# Patient Record
Sex: FEMALE | Race: WHITE | NOT HISPANIC OR LATINO | Employment: OTHER | URBAN - METROPOLITAN AREA
[De-identification: names, ages, dates, MRNs, and addresses within clinical notes are randomized per-mention and may not be internally consistent; named-entity substitution may affect disease eponyms.]

---

## 2023-10-17 ENCOUNTER — ANCILLARY PROCEDURE (OUTPATIENT)
Dept: RADIOLOGY | Facility: CLINIC | Age: 68
End: 2023-10-17
Payer: MEDICARE

## 2023-10-17 DIAGNOSIS — Z12.31 ENCOUNTER FOR SCREENING MAMMOGRAM FOR MALIGNANT NEOPLASM OF BREAST: ICD-10-CM

## 2023-10-17 PROCEDURE — 77063 BREAST TOMOSYNTHESIS BI: CPT | Mod: 50

## 2023-10-17 PROCEDURE — 77063 BREAST TOMOSYNTHESIS BI: CPT | Mod: BILATERAL PROCEDURE | Performed by: RADIOLOGY

## 2023-10-17 PROCEDURE — 77067 SCR MAMMO BI INCL CAD: CPT | Mod: BILATERAL PROCEDURE | Performed by: RADIOLOGY

## 2023-11-15 ENCOUNTER — OFFICE VISIT (OUTPATIENT)
Dept: PODIATRY | Facility: CLINIC | Age: 68
End: 2023-11-15
Payer: MEDICARE

## 2023-11-15 DIAGNOSIS — M21.622 TAILOR'S BUNION OF BOTH FEET: ICD-10-CM

## 2023-11-15 DIAGNOSIS — M79.671 PAIN IN BOTH FEET: ICD-10-CM

## 2023-11-15 DIAGNOSIS — M21.621 TAILOR'S BUNION OF BOTH FEET: ICD-10-CM

## 2023-11-15 DIAGNOSIS — L84 CORNS AND CALLOSITIES: Primary | ICD-10-CM

## 2023-11-15 DIAGNOSIS — M79.672 PAIN IN BOTH FEET: ICD-10-CM

## 2023-11-15 PROCEDURE — 99213 OFFICE O/P EST LOW 20 MIN: CPT | Performed by: PODIATRIST

## 2023-11-15 PROCEDURE — 1159F MED LIST DOCD IN RCRD: CPT | Performed by: PODIATRIST

## 2023-11-15 RX ORDER — BUTYROSPERMUM PARKII(SHEA BUTTER), SIMMONDSIA CHINENSIS (JOJOBA) SEED OIL, ALOE BARBADENSIS LEAF EXTRACT .01; 1; 3.5 G/100G; G/100G; G/100G
250 LIQUID TOPICAL 2 TIMES DAILY
COMMUNITY

## 2023-11-15 RX ORDER — CALCIUM/D3/MAG/K2/MIN/HERB 326 333-32 MG
TABLET ORAL
COMMUNITY

## 2023-11-15 NOTE — PROGRESS NOTES
Chief Complaint   Patient presents with    Foot Callouses     Left foot      Complains of painful calluses of the left foot.  No changes in PMH medications allergies or past surgical history since last visit.    Physical Exam  Patient alert, oriented, no acute distress.     (+)Pre-ulcerative hypertrophic painful lesions located: sub 5th metatarsal head L 1.5cm in size, lesion has a deep core, medial PIPJ L hallux and Linear hyperkeratotic tissue located plantar  R hallux.  No erythema, no calor noted.  No drainage, no ulcer noted.  Tailor's bunion remains, no pain on the lateral eminence B/L foot.  Palpable pedal pulses B/L, +2/4.  Light touch is intact B/L.  Muscle function is intact B/L.   No lower extremity edema noted B/L.    Assessment and plan    #1 calluses B/L foot  Paring of all hyperkeratotic tissue with #86 blade and tissue nippers without complications.   Continue with daily foot lotion  Follow-up as needed    #2  Tailor's bunions B/L foot  No pain other than the painful callus.  Continue in wide supportive shoes  Follow-up as needed

## 2024-02-07 ENCOUNTER — OFFICE VISIT (OUTPATIENT)
Dept: PODIATRY | Facility: CLINIC | Age: 69
End: 2024-02-07
Payer: MEDICARE

## 2024-02-07 DIAGNOSIS — M79.671 PAIN IN BOTH FEET: ICD-10-CM

## 2024-02-07 DIAGNOSIS — M21.621 TAILOR'S BUNION OF BOTH FEET: ICD-10-CM

## 2024-02-07 DIAGNOSIS — M79.672 PAIN IN BOTH FEET: ICD-10-CM

## 2024-02-07 DIAGNOSIS — M21.622 TAILOR'S BUNION OF BOTH FEET: ICD-10-CM

## 2024-02-07 DIAGNOSIS — L84 CORNS AND CALLOSITIES: Primary | ICD-10-CM

## 2024-02-07 PROCEDURE — 1159F MED LIST DOCD IN RCRD: CPT | Performed by: PODIATRIST

## 2024-02-07 PROCEDURE — 99213 OFFICE O/P EST LOW 20 MIN: CPT | Performed by: PODIATRIST

## 2024-02-07 NOTE — PROGRESS NOTES
Chief Complaint   Patient presents with    Foot Callouses     Left  foot 5th met area      Complains of painful calluses of the left foot.  No other pedal complaints.  No changes in PMH medications allergies or past surgical history since last visit.    Physical Exam  Patient alert, oriented, no acute distress.     Pre-ulcerative hypertrophic painful lesions located: sub 5th metatarsal head L foot 1.5cm in size, lesion has a deep core, medial PIPJ L hallux 0.5 cm in size.  No erythema, no calor noted.  No drainage, no ulcer noted.  Webspaces are dry, clean, and intact B/L.  No skin xerosis noted B/L  Tailor's bunion B/L noted.  No pain on the lateral eminence 5th met head B/L foot.  Muscle function is intact B/L.   Palpable pedal pulses B/L, +2/4.  No lower extremity edema noted B/L.  CFT brisk all digits.  Light touch is intact B/L.    Assessment and plan    #1 calluses B/L foot  Paring of all hyperkeratotic tissue with #86 blade without complications.   Cryotherapy x 3 to the lesion underlying the fifth metatarsal head.  Continue with daily foot lotion  Follow-up as needed    #2  Tailor's bunions B/L foot  No pain other than the painful callus.  Continue in wide supportive shoes  Follow-up as needed

## 2024-05-10 ENCOUNTER — OFFICE VISIT (OUTPATIENT)
Dept: PODIATRY | Facility: CLINIC | Age: 69
End: 2024-05-10
Payer: MEDICARE

## 2024-05-10 DIAGNOSIS — M79.672 PAIN IN BOTH FEET: ICD-10-CM

## 2024-05-10 DIAGNOSIS — L84 CORNS AND CALLOSITIES: Primary | ICD-10-CM

## 2024-05-10 DIAGNOSIS — M21.622 TAILOR'S BUNION OF BOTH FEET: ICD-10-CM

## 2024-05-10 DIAGNOSIS — M21.621 TAILOR'S BUNION OF BOTH FEET: ICD-10-CM

## 2024-05-10 DIAGNOSIS — M79.671 PAIN IN BOTH FEET: ICD-10-CM

## 2024-05-10 PROBLEM — M19.90 OSTEOARTHRITIS: Status: ACTIVE | Noted: 2023-09-04

## 2024-05-10 PROBLEM — L85.9 HYPERKERATOSIS: Status: ACTIVE | Noted: 2024-05-10

## 2024-05-10 PROBLEM — M79.673 ACUTE FOOT PAIN: Status: ACTIVE | Noted: 2024-05-10

## 2024-05-10 PROBLEM — R26.89 GAIT, ANTALGIC: Status: ACTIVE | Noted: 2024-05-10

## 2024-05-10 PROBLEM — R06.02 SHORTNESS OF BREATH: Status: ACTIVE | Noted: 2024-05-10

## 2024-05-10 PROBLEM — M85.80 OSTEOPENIA: Status: ACTIVE | Noted: 2023-09-04

## 2024-05-10 PROBLEM — M72.2 PLANTAR FASCIITIS, RIGHT: Status: ACTIVE | Noted: 2024-05-10

## 2024-05-10 PROBLEM — R31.1 BENIGN MICROSCOPIC HEMATURIA: Status: ACTIVE | Noted: 2023-09-04

## 2024-05-10 PROBLEM — F17.210 SMOKING 1/2 PACK A DAY OR LESS: Status: ACTIVE | Noted: 2023-08-14

## 2024-05-10 PROBLEM — K63.5 POLYP OF COLON: Status: ACTIVE | Noted: 2022-09-12

## 2024-05-10 PROBLEM — E78.5 DYSLIPIDEMIA: Status: ACTIVE | Noted: 2023-08-14

## 2024-05-10 PROBLEM — K64.8 HEMORRHOIDS, INTERNAL: Status: ACTIVE | Noted: 2022-09-12

## 2024-05-10 PROBLEM — M51.9 DISORDER OF INTERVERTEBRAL DISC OF LUMBAR SPINE: Status: ACTIVE | Noted: 2023-08-14

## 2024-05-10 PROBLEM — D70.8 OTHER NEUTROPENIA (CMS-HCC): Status: ACTIVE | Noted: 2023-08-14

## 2024-05-10 PROBLEM — F17.200 SMOKING: Status: ACTIVE | Noted: 2023-08-14

## 2024-05-10 PROBLEM — R94.31 ABNORMAL EKG: Status: ACTIVE | Noted: 2024-05-10

## 2024-05-10 PROCEDURE — 99213 OFFICE O/P EST LOW 20 MIN: CPT | Performed by: PODIATRIST

## 2024-05-10 PROCEDURE — 1160F RVW MEDS BY RX/DR IN RCRD: CPT | Performed by: PODIATRIST

## 2024-05-10 PROCEDURE — 1159F MED LIST DOCD IN RCRD: CPT | Performed by: PODIATRIST

## 2024-05-10 NOTE — PROGRESS NOTES
Chief Complaint   Patient presents with    Foot Callouses     Patient is here today for calluses 5th met area DORINA      Complains of painful calluses bilateral foot.  Most pain is on the left foot underlying the fifth metatarsal head.  Patient tolerated cryotherapy well.    Physical Exam  Patient alert, oriented, no acute distress.     Pre-ulcerative hypertrophic painful lesions located: sub 5th metatarsal head L foot 1.5cm in size, lesion has a deep core, medial PIPJ L hallux 0.5 cm in size, linear lesion right plantar hallux and right plantar third digit.    No cellulitis noted B/L   Webspaces are dry, clean, and intact B/L.  No skin xerosis noted B/L  Tailor's bunion B/L noted.  No pain on the lateral eminence 5th met head B/L foot.  Muscle function is intact B/L.   Palpable pedal pulses B/L, +2/4.  No lower extremity edema noted B/L.  CFT brisk all digits.  Light touch is intact B/L.    Assessment and plan    #1 calluses B/L foot  Paring of all hyperkeratotic tissue with #15 blade without complications.   Cryotherapy x 3 to the lesion underlying the fifth metatarsal head left.  Continue with daily foot lotion  Follow-up as needed    #2  Tailor's bunions B/L foot  No pain other than the painful callus.  Continue in wide supportive shoes  Follow-up as needed

## 2024-07-17 ENCOUNTER — APPOINTMENT (OUTPATIENT)
Dept: PODIATRY | Facility: CLINIC | Age: 69
End: 2024-07-17
Payer: MEDICARE

## 2024-07-17 DIAGNOSIS — M79.671 PAIN IN BOTH FEET: ICD-10-CM

## 2024-07-17 DIAGNOSIS — M79.672 PAIN IN BOTH FEET: ICD-10-CM

## 2024-07-17 DIAGNOSIS — L84 CORNS AND CALLOSITIES: Primary | ICD-10-CM

## 2024-07-17 DIAGNOSIS — M21.621 TAILOR'S BUNION OF BOTH FEET: ICD-10-CM

## 2024-07-17 DIAGNOSIS — M21.622 TAILOR'S BUNION OF BOTH FEET: ICD-10-CM

## 2024-07-17 PROCEDURE — 99213 OFFICE O/P EST LOW 20 MIN: CPT | Performed by: PODIATRIST

## 2024-07-17 PROCEDURE — 1160F RVW MEDS BY RX/DR IN RCRD: CPT | Performed by: PODIATRIST

## 2024-07-17 PROCEDURE — 1159F MED LIST DOCD IN RCRD: CPT | Performed by: PODIATRIST

## 2024-07-17 NOTE — PROGRESS NOTES
Chief Complaint   Patient presents with    Foot Callouses     Patient is here today for calluses 5th met area DORINA      HPI: Complains of painful calluses bilateral foot.  Most pain is on the left foot underlying the fifth metatarsal head.  Patient tolerated cryotherapy well.  Patient requests paring of calluses .  Patient has been doing a lot of yard work in flip flops.  Patient is been using padding on the left     Physical Exam  Patient alert, oriented, no acute distress.     Pre-ulcerative hypertrophic painful lesions located: sub 5th metatarsal head L foot 2cm in size, lesion has a deep core, medial PIPJ L hallux 1 cm in size, linear lesion right plantar hallux and right plantar third digit.    No cellulitis noted B/L   Webspaces are dry, clean, and intact B/L.  No skin xerosis noted B/L  Tailor's bunion B/L noted.  No pain on the lateral eminence 5th met head B/L foot.  Muscle function is intact B/L.   Palpable pedal pulses B/L, +2/4.  No lower extremity edema noted B/L.  CFT brisk all digits.  Light touch is intact B/L.    Assessment and plan    #1 calluses B/L foot  Paring of all hyperkeratotic tissue with #86 blade without complications.   Cryotherapy x 3 to the lesion underlying the fifth metatarsal head left.  Continue with daily foot lotion  Patient instructed on wearing more supportive shoes with more padding while doing yard work.  Follow-up as needed    #2  Tailor's bunions B/L foot  No pain other than the painful callus.  Continue in wide supportive shoes  Follow-up as needed

## 2024-09-18 ENCOUNTER — APPOINTMENT (OUTPATIENT)
Dept: PODIATRY | Facility: CLINIC | Age: 69
End: 2024-09-18
Payer: MEDICARE

## 2024-09-18 DIAGNOSIS — L84 CORNS AND CALLOSITIES: Primary | ICD-10-CM

## 2024-09-18 DIAGNOSIS — M21.621 TAILOR'S BUNION OF BOTH FEET: ICD-10-CM

## 2024-09-18 DIAGNOSIS — M79.671 PAIN IN BOTH FEET: ICD-10-CM

## 2024-09-18 DIAGNOSIS — M79.672 PAIN IN BOTH FEET: ICD-10-CM

## 2024-09-18 DIAGNOSIS — M21.622 TAILOR'S BUNION OF BOTH FEET: ICD-10-CM

## 2024-09-18 PROCEDURE — 99213 OFFICE O/P EST LOW 20 MIN: CPT | Performed by: PODIATRIST

## 2024-09-18 PROCEDURE — 1159F MED LIST DOCD IN RCRD: CPT | Performed by: PODIATRIST

## 2024-09-18 PROCEDURE — 1160F RVW MEDS BY RX/DR IN RCRD: CPT | Performed by: PODIATRIST

## 2024-09-18 NOTE — PROGRESS NOTES
Chief Complaint   Patient presents with    Foot Callouses     PT here for foot callus care LT Foot       HPI: Complains of painful calluses.  Most pain is on the left foot underlying the fifth metatarsal head.  Patient tolerated cryotherapy well.      Physical Exam  Patient alert, oriented, no acute distress.     DERM: Pre-ulcerative hypertrophic painful lesions located: sub 5th metatarsal head L foot 2cm in size, lesion has a deep core, medial PIPJ L hallux 1 cm in size, linear lesion right plantar hallux and right plantar third digit.    No cellulitis noted B/L   Webspaces are dry, clean, and intact B/L.  No skin xerosis noted B/L    MUSCULOSKEL: Tailor's bunion B/L noted.  No pain on the lateral eminence 5th met head B/L foot.  Muscle function is intact B/L.     VASC: Palpable pedal pulses B/L, +2/4.  No lower extremity edema noted B/L.  CFT brisk all digits.    NEURO: Light touch is intact B/L.    Assessment and plan    #1 calluses B/L foot  Paring of all hyperkeratotic tissue with #86 blade without complications.   Cryotherapy x 3 to the lesion underlying the fifth metatarsal head left.  Continue with daily foot lotion  Follow-up as needed    #2  Tailor's bunions B/L foot  No pain other than the painful callus.  Continue in wide supportive shoes  Follow-up as needed

## 2024-09-19 ENCOUNTER — APPOINTMENT (OUTPATIENT)
Dept: PODIATRY | Facility: CLINIC | Age: 69
End: 2024-09-19
Payer: MEDICARE

## 2024-09-20 ENCOUNTER — APPOINTMENT (OUTPATIENT)
Dept: PODIATRY | Facility: CLINIC | Age: 69
End: 2024-09-20
Payer: MEDICARE

## 2024-10-21 ENCOUNTER — HOSPITAL ENCOUNTER (OUTPATIENT)
Dept: RADIOLOGY | Facility: CLINIC | Age: 69
Discharge: HOME | End: 2024-10-21
Payer: MEDICARE

## 2024-10-21 VITALS — BODY MASS INDEX: 25.52 KG/M2 | HEIGHT: 60 IN | WEIGHT: 130 LBS

## 2024-10-21 DIAGNOSIS — Z12.31 ENCOUNTER FOR SCREENING MAMMOGRAM FOR MALIGNANT NEOPLASM OF BREAST: ICD-10-CM

## 2024-10-21 PROCEDURE — 77067 SCR MAMMO BI INCL CAD: CPT

## 2024-10-21 PROCEDURE — 77067 SCR MAMMO BI INCL CAD: CPT | Performed by: RADIOLOGY

## 2024-10-21 PROCEDURE — 77063 BREAST TOMOSYNTHESIS BI: CPT | Performed by: RADIOLOGY

## 2024-11-20 ENCOUNTER — OFFICE VISIT (OUTPATIENT)
Dept: PODIATRY | Facility: CLINIC | Age: 69
End: 2024-11-20
Payer: MEDICARE

## 2024-11-20 DIAGNOSIS — M79.672 PAIN IN BOTH FEET: ICD-10-CM

## 2024-11-20 DIAGNOSIS — M21.621 TAILOR'S BUNION OF BOTH FEET: ICD-10-CM

## 2024-11-20 DIAGNOSIS — L84 CORNS AND CALLOSITIES: Primary | ICD-10-CM

## 2024-11-20 DIAGNOSIS — M79.671 PAIN IN BOTH FEET: ICD-10-CM

## 2024-11-20 DIAGNOSIS — M21.622 TAILOR'S BUNION OF BOTH FEET: ICD-10-CM

## 2024-11-20 PROCEDURE — 1159F MED LIST DOCD IN RCRD: CPT | Performed by: PODIATRIST

## 2024-11-20 PROCEDURE — 99213 OFFICE O/P EST LOW 20 MIN: CPT | Performed by: PODIATRIST

## 2024-11-20 PROCEDURE — 1160F RVW MEDS BY RX/DR IN RCRD: CPT | Performed by: PODIATRIST

## 2024-11-20 NOTE — PROGRESS NOTES
Chief Complaint   Patient presents with    Foot Callouses     PT here for callous care LT foot        HPI: Complains of painful calluses that limit ambulation.  Patient tolerated cryotherapy well.  No other pedal complaints.    Physical Exam  Patient alert, oriented, no acute distress.     DERM: Pre-ulcerative hypertrophic painful lesions located: sub 5th metatarsal head L foot 2cm in size, lesion has a deep core, medial PIPJ L hallux 1 cm in size, linear lesion right plantar hallux and right plantar third digit.    No ulcers noted B/L  No cellulitis noted B/L   Webspaces are dry, clean, and intact B/L.  Skin appears to be well-hydrated.    MUSCULOSKEL: Tailor's bunion B/L noted.  No pain on the lateral eminence 5th met head B/L foot.  Muscle function is intact B/L.     VASC: Palpable pedal pulses B/L, +2/4.  No lower extremity edema noted B/L.  CFT brisk all digits.    NEURO: Light touch is intact B/L.    Assessment and plan  #1 calluses B/L foot  Paring of all hyperkeratotic tissue with #86 blade without complications.   Cryotherapy x 3 to the lesion underlying the fifth metatarsal head left.  Continue with daily foot lotion  Follow-up as needed    #2  Tailor's bunions B/L foot  No pain other than the painful callus.  Continue in wide supportive shoes  Follow-up as needed

## 2024-11-22 ENCOUNTER — HOSPITAL ENCOUNTER (OUTPATIENT)
Dept: RADIOLOGY | Facility: CLINIC | Age: 69
Discharge: HOME | End: 2024-11-22
Payer: MEDICARE

## 2024-11-22 ENCOUNTER — OFFICE VISIT (OUTPATIENT)
Dept: ORTHOPEDIC SURGERY | Facility: CLINIC | Age: 69
End: 2024-11-22
Payer: MEDICARE

## 2024-11-22 VITALS — BODY MASS INDEX: 25.52 KG/M2 | HEIGHT: 60 IN | WEIGHT: 130 LBS

## 2024-11-22 DIAGNOSIS — M25.551 HIP PAIN, ACUTE, RIGHT: ICD-10-CM

## 2024-11-22 DIAGNOSIS — M16.11 ARTHRITIS OF RIGHT HIP: Primary | ICD-10-CM

## 2024-11-22 PROCEDURE — 1160F RVW MEDS BY RX/DR IN RCRD: CPT

## 2024-11-22 PROCEDURE — 99203 OFFICE O/P NEW LOW 30 MIN: CPT

## 2024-11-22 PROCEDURE — 73502 X-RAY EXAM HIP UNI 2-3 VIEWS: CPT | Mod: RT

## 2024-11-22 PROCEDURE — 1159F MED LIST DOCD IN RCRD: CPT

## 2024-11-22 PROCEDURE — 99213 OFFICE O/P EST LOW 20 MIN: CPT

## 2024-11-22 PROCEDURE — 3008F BODY MASS INDEX DOCD: CPT

## 2024-11-25 NOTE — PROGRESS NOTES
Subjective    Patient ID: Michelle Angeles is a 69 y.o. female.    Chief Complaint: Pain of the Right Hip (FOR 2 MONTHS/NKI)     HPI  This is a pleasant 69-year-old female presenting to the office for evaluation of right hip pain, which has been ongoing for about 2 months.  There has been no known injury.  She points directly to her groin when describing the pain.  She does not have any pain to the lateral aspect of her hip or her back.  She has not noticed any significant difficulty applying her socks and shoes, but has noticed some degree of limited range of motion of her hip.  She does not experience any cracking or popping.  She states she has been sleeping with pillows in between her knees which has been of some benefit.  She has been taking ibuprofen and applying ice with minimal relief of symptoms.  She likes to take a holistic natural approach to health.  She is retired.    The patient's past medical, surgical, family, and social history as well as allergies and medications were reviewed and updated in the chart.    Objective   Ortho Exam  Pleasant and no acute distress. Walks with a normal gait. Normal right hip appearance without overlying skin changes, ecchymosis, swelling. No tenderness to palpation of right greater trochanter. Patient can arise off the chair and ambulate in the office today weightbearing as tolerated without a limp or use of an assistive device. The right hip has flexion to 90 degrees, with decreased internal and external rotation. Range of motion elicits groin pain. There is no effusion or instability. No effusion, instability, pain, or crepitus. Both lower extremeties are well perfused, skin is intact, and muscle tone is adequate. Sensation intact to light touch.    Image Results:  XR hip right with pelvis when performed 2 or 3 views  Narrative: Interpreted By:  Gallo Martinez,   STUDY:  XR HIP RIGHT WITH PELVIS WHEN PERFORMED 2 OR 3 VIEWS      INDICATION:  Signs/Symptoms:hip pain.       COMPARISON:  None      ACCESSION NUMBER(S):  [id number]      ORDERING CLINICIAN:  YANY RAMON      FINDINGS:  Moderate osteoarthritis right hip.      No evidence of fracture or lesion.      Impression: Moderate osteoarthritis right hip.      Signed by: Gallo Martinez 11/23/2024 9:45 AM  Dictation workstation:   [id number]    Multiple view x-rays of the right hip obtained today personally reviewed, without evidence of acute fracture or dislocation.  There are mild to moderate degenerative changes noted of the right hip joint space with osteophyte formation noted.    Assessment/Plan   Encounter Diagnoses:  Arthritis of right hip    Hip pain, acute, right    Plan: Discussion with patient in regards to right hip arthritis with review of today's x-rays.  Conservative and surgical treatment options were discussed at length.  Patient is not interested in surgical intervention at this time.  She also would like to take a more natural and holistic approach to pain.  She is aware that she can take ibuprofen and Tylenol.  Explained to patient that physical therapy would be of benefit, referral provided.  She will avoid aggravating activities.  We did talk about turmeric and glucosamine as well as topical gels, heat and ice application.  She can continue to use a pillow in between her knees when sleeping.  We also discussed a ultrasound-guided right hip injection by my colleague Dr. Boyce, but patient defers at this time.  She can follow-up as needed.    Orders Placed This Encounter    XR hip right with pelvis when performed 2 or 3 views     No follow-ups on file.

## 2024-11-26 ENCOUNTER — APPOINTMENT (OUTPATIENT)
Dept: PODIATRY | Facility: CLINIC | Age: 69
End: 2024-11-26
Payer: MEDICARE

## 2024-12-03 ENCOUNTER — APPOINTMENT (OUTPATIENT)
Dept: PODIATRY | Facility: CLINIC | Age: 69
End: 2024-12-03
Payer: MEDICARE

## 2025-01-23 ENCOUNTER — OFFICE VISIT (OUTPATIENT)
Dept: PODIATRY | Facility: CLINIC | Age: 70
End: 2025-01-23
Payer: MEDICARE

## 2025-01-23 DIAGNOSIS — M79.671 PAIN IN BOTH FEET: ICD-10-CM

## 2025-01-23 DIAGNOSIS — M79.672 PAIN IN BOTH FEET: ICD-10-CM

## 2025-01-23 DIAGNOSIS — M21.622 TAILOR'S BUNION OF BOTH FEET: ICD-10-CM

## 2025-01-23 DIAGNOSIS — M21.621 TAILOR'S BUNION OF BOTH FEET: ICD-10-CM

## 2025-01-23 DIAGNOSIS — L84 CORNS AND CALLOSITIES: Primary | ICD-10-CM

## 2025-01-23 PROCEDURE — 1160F RVW MEDS BY RX/DR IN RCRD: CPT | Performed by: PODIATRIST

## 2025-01-23 PROCEDURE — 1159F MED LIST DOCD IN RCRD: CPT | Performed by: PODIATRIST

## 2025-01-23 PROCEDURE — 99213 OFFICE O/P EST LOW 20 MIN: CPT | Performed by: PODIATRIST

## 2025-01-23 NOTE — PROGRESS NOTES
Chief Complaint   Patient presents with    Foot Callouses     PT here today for LT foot callous care        HPI: Complains of painful calluses that limit ambulation.  Patient tolerated cryotherapy well.  No other pedal complaints.    Physical Exam  Patient alert, oriented, no acute distress.  Respiratory: non labored breathing  Psychiatric: Mood and affect normal/baseline  HEENT: sclera clear    DERM: Pre-ulcerative hypertrophic painful lesions located: sub 5th metatarsal head L foot 2cm in size, lesion has a deep core, medial PIPJ L hallux 1 cm in size, linear lesion right plantar hallux and right plantar third digit.    No ulcers noted B/L  No erythema, no edema, no calor, and no drainage, no ulcerations noted  Webspaces are dry, clean, and intact B/L.  Skin appears to be well-hydrated.    MUSCULOSKEL: Tailor's bunion B/L noted.  No pain on the lateral eminence 5th met head B/L foot.  Muscle function is intact B/L.     VASC: Palpable pedal pulses B/L, +2/4.  No lower extremity edema noted B/L.  CFT brisk all digits.    NEURO: Light touch is intact B/L.    Assessment and plan  #1 calluses B/L foot  Paring of all hyperkeratotic tissue with #15 blade without complications.   Cryotherapy x 3 to the lesion underlying the fifth metatarsal head left.  Continue with daily foot lotion  Follow-up as needed    #2  Tailor's bunions B/L foot  Continue in wide supportive shoes  Follow-up as needed

## 2025-03-19 ENCOUNTER — OFFICE VISIT (OUTPATIENT)
Dept: PODIATRY | Facility: CLINIC | Age: 70
End: 2025-03-19
Payer: MEDICARE

## 2025-03-19 DIAGNOSIS — M21.622 TAILOR'S BUNION OF BOTH FEET: ICD-10-CM

## 2025-03-19 DIAGNOSIS — M21.621 TAILOR'S BUNION OF BOTH FEET: ICD-10-CM

## 2025-03-19 DIAGNOSIS — M79.671 PAIN IN BOTH FEET: ICD-10-CM

## 2025-03-19 DIAGNOSIS — L84 CORNS AND CALLOSITIES: Primary | ICD-10-CM

## 2025-03-19 DIAGNOSIS — M79.672 PAIN IN BOTH FEET: ICD-10-CM

## 2025-03-19 PROCEDURE — 1159F MED LIST DOCD IN RCRD: CPT | Performed by: PODIATRIST

## 2025-03-19 PROCEDURE — 1160F RVW MEDS BY RX/DR IN RCRD: CPT | Performed by: PODIATRIST

## 2025-03-19 PROCEDURE — 99213 OFFICE O/P EST LOW 20 MIN: CPT | Performed by: PODIATRIST

## 2025-03-19 NOTE — PROGRESS NOTES
Chief Complaint   Patient presents with    Foot Callouses     PATIENT HERE FOR CALLOUS CARE       HPI: Complains of painful calluses that limit ambulation.  Patient tolerated cryotherapy well.  Patient is wearing metatarsal pads daily.  Patient is wearing supportive shoes.  Physical Exam  Patient alert, oriented, no acute distress.  Respiratory: non labored breathing  Psychiatric: Mood and affect normal/baseline  HEENT: sclera clear    DERM: Pre-ulcerative hypertrophic painful lesions located: sub 5th metatarsal head L foot 2cm in size, lesion has a deep core, medial PIPJ L hallux 1 cm in size, linear lesion.  No ulcers noted B/L  No erythema, no edema, no calor, and no drainage, no ulcerations noted  Webspaces are dry, clean, and intact B/L.  Skin appears to be well-hydrated.    MUSCULOSKEL: Tailor's bunion B/L noted.  No pain on the lateral eminence 5th met head B/L foot.  Muscle function is intact B/L.   Adductovarus fifth digits B/L    VASC: Palpable pedal pulses B/L, +2/4.  No lower extremity edema noted B/L.  CFT brisk all digits.    NEURO: Light touch is intact B/L.    Assessment and plan  #1 calluses B/L foot  Paring of all hyperkeratotic tissue with #86 blade without complications.   Trichloroacetic acid and occlusive bandage applied to the lesion underlying the fifth metatarsal head left.  Leave intact for 24 hours  Continue with daily foot lotion, metatarsal pads and supportive shoes  Follow-up as needed    #2  Tailor's bunions B/L foot  Continue in wide supportive shoes  Follow-up as needed

## 2025-05-21 ENCOUNTER — OFFICE VISIT (OUTPATIENT)
Dept: PODIATRY | Facility: CLINIC | Age: 70
End: 2025-05-21
Payer: MEDICARE

## 2025-05-21 DIAGNOSIS — L84 CORNS AND CALLOSITIES: Primary | ICD-10-CM

## 2025-05-21 DIAGNOSIS — M79.672 LEFT FOOT PAIN: ICD-10-CM

## 2025-05-21 PROCEDURE — 1159F MED LIST DOCD IN RCRD: CPT | Performed by: PODIATRIST

## 2025-05-21 PROCEDURE — 11056 PARNG/CUTG B9 HYPRKR LES 2-4: CPT | Performed by: PODIATRIST

## 2025-05-21 PROCEDURE — 1160F RVW MEDS BY RX/DR IN RCRD: CPT | Performed by: PODIATRIST

## 2025-05-21 NOTE — PROGRESS NOTES
Chief Complaint   Patient presents with    Follow-up     L FOOT CALLOUS       HPI: Complains of painful calluses that limit ambulation.  Patient tolerated cryotherapy well.  Patient is wearing metatarsal pads daily.  Patient is wearing supportive shoes.    Physical Exam  Patient alert, oriented, no acute distress.    DERM: Pre-ulcerative hypertrophic painful lesions located: sub 5th metatarsal head L foot 2cm in size, lesion has a deep core, medial PIPJ L hallux 1 cm in size.  No ulcers noted B/L  No erythema, no edema, no calor, and no drainage noted  Webspaces are dry, clean, and intact B/L.  No skin xerosis noted B/L.    MUSCULOSKEL: Tailor's bunion B/L noted.  No pain on the lateral eminence 5th met head B/L foot.  Muscle function is intact B/L.   Adductovarus fifth digits B/L    VASC: Palpable pedal pulses B/L, +2/4.  No lower extremity edema noted B/L.  CFT brisk all digits.    NEURO: Light touch is intact B/L.    Assessment and plan  #1 calluses L foot  Paring of all hyperkeratotic tissue with #86 blade without complications.   Cryotherapy x 3 applications to the lesion underlying the fifth metatarsal head.  Continue with daily foot lotion, metatarsal pads and supportive shoes  Follow-up as needed    #2  Tailor's bunions B/L foot  Continue in wide supportive shoes  Follow-up as needed

## 2025-07-23 ENCOUNTER — PROCEDURE VISIT (OUTPATIENT)
Dept: PODIATRY | Facility: CLINIC | Age: 70
End: 2025-07-23
Payer: MEDICARE

## 2025-07-23 DIAGNOSIS — L84 CORNS AND CALLOSITIES: Primary | ICD-10-CM

## 2025-07-23 DIAGNOSIS — M79.671 PAIN IN BOTH FEET: ICD-10-CM

## 2025-07-23 DIAGNOSIS — M79.672 PAIN IN BOTH FEET: ICD-10-CM

## 2025-07-23 PROCEDURE — 11056 PARNG/CUTG B9 HYPRKR LES 2-4: CPT | Performed by: PODIATRIST

## 2025-07-23 NOTE — PROGRESS NOTES
Chief Complaint   Patient presents with    Foot Callouses     RT FOOT CALLOUS CARE     HPI: Complains of painful calluses that limit ambulation.  Patient is wearing supportive shoes.  No other pedal complaints.    Physical Exam  Patient alert, oriented, no acute distress.    DERM: Pre-ulcerative hypertrophic painful lesions located: sub 5th metatarsal head L foot 2cm in size, lesion has a deep core, medial PIPJ L hallux 1 cm in size, plantar third right digit 0.5 cm, linear shaped.  No ulcers noted B/L  No erythema, no edema, no calor, and no drainage noted  Webspaces are dry, clean, and intact B/L.  No skin xerosis noted B/L.    MUSCULOSKEL: Tailor's bunion B/L noted.  No pain on the lateral eminence 5th met head B/L foot.  Muscle function is intact B/L.   Adductovarus fifth digits B/L    VASC: Palpable pedal pulses B/L, +2/4.  No lower extremity edema noted B/L.  CFT brisk all digits.    NEURO: Light touch is intact B/L.  Pain on palpation of the painful calluses.    Assessment and plan  #1 calluses   Paring of all hyperkeratotic tissue with #86 blade without complications.   Continue with daily foot lotion, metatarsal pads and supportive shoes  Follow-up as needed    #2  Tailor's bunions B/L foot  Continue in wide supportive shoes  Follow-up as needed